# Patient Record
Sex: FEMALE | URBAN - METROPOLITAN AREA
[De-identification: names, ages, dates, MRNs, and addresses within clinical notes are randomized per-mention and may not be internally consistent; named-entity substitution may affect disease eponyms.]

---

## 2021-05-28 ENCOUNTER — OFFICE VISIT (OUTPATIENT)
Dept: URBAN - METROPOLITAN AREA CLINIC 27 | Facility: CLINIC | Age: 66
End: 2021-05-28
Payer: COMMERCIAL

## 2021-05-28 DIAGNOSIS — H53.10 UNSPECIFIED SUBJECTIVE VISUAL DISTURBANCES: Primary | ICD-10-CM

## 2021-05-28 DIAGNOSIS — H04.123 DRY EYE SYNDROME OF BILATERAL LACRIMAL GLANDS: ICD-10-CM

## 2021-05-28 DIAGNOSIS — H33.8 OTHER RETINAL DETACHMENTS: ICD-10-CM

## 2021-05-28 DIAGNOSIS — H43.812 VITREOUS DEGENERATION, LEFT EYE: ICD-10-CM

## 2021-05-28 DIAGNOSIS — H25.11 AGE-RELATED NUCLEAR CATARACT, RIGHT EYE: ICD-10-CM

## 2021-05-28 DIAGNOSIS — H35.81 RETINAL EDEMA: ICD-10-CM

## 2021-05-28 DIAGNOSIS — H33.313 HORSESHOE TEAR OF RETINA WITHOUT DETACHMENT, BILATERAL: ICD-10-CM

## 2021-05-28 DIAGNOSIS — Z96.1 PRESENCE OF INTRAOCULAR LENS: ICD-10-CM

## 2021-05-28 PROCEDURE — 99214 OFFICE O/P EST MOD 30 MIN: CPT | Performed by: OPHTHALMOLOGY

## 2021-05-28 PROCEDURE — 92134 CPTRZ OPH DX IMG PST SGM RTA: CPT | Performed by: OPHTHALMOLOGY

## 2021-05-28 PROCEDURE — 92235 FLUORESCEIN ANGRPH MLTIFRAME: CPT | Performed by: OPHTHALMOLOGY

## 2021-05-28 ASSESSMENT — INTRAOCULAR PRESSURE
OS: 18
OD: 18

## 2021-05-28 NOTE — IMPRESSION/PLAN
Impression: h/o ERM, OS. Status: Symptomatic.
s/p PPV/MP ILM/ICG/PRP/dex 12/11/17 ([address] [place] [address])
s/p STK 08/07/15
s/p PPV / MP / PRP 06/09/14 (K)
s/p IVK x last 11/20/15 Plan: Exam and OCT reveal macular thickening OS (358 microns, from 405 microns). Observe.

## 2021-05-28 NOTE — IMPRESSION/PLAN
Impression: Visual distortion, OS. Status: Symptomatic. Plan: No interval changes. The patient notes a gray patch in her nasal field OS, which she feels is lightening slightly. VA has worsened from 20/30 to 20/400 OS following surgery. Exam and OCT reveal macular thickening OS (344 microns, from 405 microns). An IVFA from 05/28/2021 demonstrated no RVO or leakage. Fundus Photos from 05/28/2021 demonstrated a normal appearing fundus. I see no obvious retinal cause for the decreased VA, the surgical procedure was uneventful. The differential diagnosis includes nghia-operative NAION vs anesthesia block toxicity vs reperfused BRAO. Has seen Dr. Vikram Paris at [facility]. Observe retina. Thanks, Narendra Damon in 12 months for follow up, OCT OU, IVFA OS 1st

## 2021-05-28 NOTE — IMPRESSION/PLAN
Impression: h/o RD, OS. Status: Symptomatic.
s/p PPV/SBP 10/10/16 (Dr. Leger Friend) Plan: Posteriorly placed SB. Retina flat. Observe. RDW.

## 2021-05-28 NOTE — IMPRESSION/PLAN
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 Status: Chronic. Plan: No interval change. Observe.

## 2022-06-01 ENCOUNTER — OFFICE VISIT (OUTPATIENT)
Dept: URBAN - METROPOLITAN AREA CLINIC 27 | Facility: CLINIC | Age: 67
End: 2022-06-01
Payer: MEDICARE

## 2022-06-01 DIAGNOSIS — H33.8 OTHER RETINAL DETACHMENTS: ICD-10-CM

## 2022-06-01 DIAGNOSIS — H35.81 RETINAL EDEMA: ICD-10-CM

## 2022-06-01 DIAGNOSIS — H04.123 DRY EYE SYNDROME OF BILATERAL LACRIMAL GLANDS: ICD-10-CM

## 2022-06-01 DIAGNOSIS — H25.11 AGE-RELATED NUCLEAR CATARACT, RIGHT EYE: ICD-10-CM

## 2022-06-01 DIAGNOSIS — H33.313 HORSESHOE TEAR OF RETINA WITHOUT DETACHMENT, BILATERAL: ICD-10-CM

## 2022-06-01 DIAGNOSIS — Z96.1 PRESENCE OF INTRAOCULAR LENS: ICD-10-CM

## 2022-06-01 DIAGNOSIS — H43.812 VITREOUS DEGENERATION, LEFT EYE: ICD-10-CM

## 2022-06-01 DIAGNOSIS — H53.10 UNSPECIFIED SUBJECTIVE VISUAL DISTURBANCES: Primary | ICD-10-CM

## 2022-06-01 PROCEDURE — 99214 OFFICE O/P EST MOD 30 MIN: CPT | Performed by: OPHTHALMOLOGY

## 2022-06-01 PROCEDURE — 92235 FLUORESCEIN ANGRPH MLTIFRAME: CPT | Performed by: OPHTHALMOLOGY

## 2022-06-01 PROCEDURE — 92134 CPTRZ OPH DX IMG PST SGM RTA: CPT | Performed by: OPHTHALMOLOGY

## 2022-06-01 ASSESSMENT — INTRAOCULAR PRESSURE
OD: 11
OS: 13

## 2022-06-01 NOTE — IMPRESSION/PLAN
Impression: h/o ERM, OS. Status: Symptomatic.
s/p PPV/MP ILM/ICG/PRP/dex 12/11/17 ([address] [place] [address])
s/p STK 08/07/15
s/p PPV / MP / PRP 06/09/14 (K)
s/p IVK x last 11/20/15 Plan: Exam and OCT reveal macular thickening OS (246 microns, from 405 microns). Observe.

## 2022-06-01 NOTE — IMPRESSION/PLAN
Impression: h/o RD, OS. Status: Symptomatic.
s/p PPV/SBP 10/10/16 (Dr. Dewayne Bautista) Plan: Posteriorly placed SB. Retina flat. Observe. RDW.

## 2023-04-05 ENCOUNTER — OFFICE VISIT (OUTPATIENT)
Dept: URBAN - METROPOLITAN AREA CLINIC 27 | Facility: CLINIC | Age: 68
End: 2023-04-05
Payer: MEDICARE

## 2023-04-05 DIAGNOSIS — H04.123 DRY EYE SYNDROME OF BILATERAL LACRIMAL GLANDS: ICD-10-CM

## 2023-04-05 DIAGNOSIS — H53.10 UNSPECIFIED SUBJECTIVE VISUAL DISTURBANCES: Primary | ICD-10-CM

## 2023-04-05 DIAGNOSIS — H35.81 RETINAL EDEMA: ICD-10-CM

## 2023-04-05 DIAGNOSIS — Z96.1 PRESENCE OF INTRAOCULAR LENS: ICD-10-CM

## 2023-04-05 DIAGNOSIS — H43.812 VITREOUS DEGENERATION, LEFT EYE: ICD-10-CM

## 2023-04-05 DIAGNOSIS — H25.11 AGE-RELATED NUCLEAR CATARACT, RIGHT EYE: ICD-10-CM

## 2023-04-05 DIAGNOSIS — H33.313 HORSESHOE TEAR OF RETINA WITHOUT DETACHMENT, BILATERAL: ICD-10-CM

## 2023-04-05 DIAGNOSIS — H33.8 OTHER RETINAL DETACHMENTS: ICD-10-CM

## 2023-04-05 PROCEDURE — 92134 CPTRZ OPH DX IMG PST SGM RTA: CPT | Performed by: OPHTHALMOLOGY

## 2023-04-05 PROCEDURE — 99214 OFFICE O/P EST MOD 30 MIN: CPT | Performed by: OPHTHALMOLOGY

## 2023-04-05 ASSESSMENT — INTRAOCULAR PRESSURE
OD: 16
OS: 17

## 2023-04-05 NOTE — IMPRESSION/PLAN
Impression: h/o RD, OS. Status: Symptomatic.
s/p PPV/SBP 10/10/16 (Dr. Marline Jones) Plan: Posteriorly placed SB. Retina flat. Observe. RDW.

## 2023-04-05 NOTE — IMPRESSION/PLAN
Impression: Visual distortion, OS. Status: Symptomatic. Plan: No interval changes. The patient notes a gray patch in her nasal field OS, which she feels is lightening slightly. VA has worsened from 20/30 to 20/400 OS following surgery. Exam and OCT reveal macular thickening OS (246 microns, from 405 microns). An IVFA from 06/01/2022 demonstrated no RVO or leakage. Fundus Photos from 06/01/2022 demonstrated a normal appearing fundus. I see no obvious retinal cause for the decreased VA, the surgical procedure was uneventful. The differential diagnosis includes nghia-operative NAION vs anesthesia block toxicity vs reperfused BRAO. Has seen Dr. Mariam Alcocer at [facility]. Observe retina. Thanks, Chao Bonilla Return in 18 months for follow up, OCT OU, IVFA OS 1st

## 2023-04-05 NOTE — IMPRESSION/PLAN
Impression: h/o ERM, OS. Status: Symptomatic.
s/p PPV/MP ILM/ICG/PRP/dex 12/11/17 ([address] [place] [address])
s/p STK 08/07/15
s/p PPV / MP / PRP 06/09/14 (K)
s/p IVK x last 11/20/15 Plan: Exam and OCT reveal macular thickening OS (323 microns, from 405 microns). Observe.

## 2023-12-28 ENCOUNTER — OFFICE VISIT (OUTPATIENT)
Dept: URBAN - METROPOLITAN AREA CLINIC 27 | Facility: CLINIC | Age: 68
End: 2023-12-28
Payer: MEDICARE

## 2023-12-28 DIAGNOSIS — H33.8 OTHER RETINAL DETACHMENTS: ICD-10-CM

## 2023-12-28 DIAGNOSIS — H47.092 OTHER DISORDER OF OPTIC NERVE OF LT EYE: ICD-10-CM

## 2023-12-28 DIAGNOSIS — H35.81 RETINAL EDEMA: Primary | ICD-10-CM

## 2023-12-28 DIAGNOSIS — H25.11 AGE-RELATED NUCLEAR CATARACT, RIGHT EYE: ICD-10-CM

## 2023-12-28 DIAGNOSIS — Z96.1 PRESENCE OF INTRAOCULAR LENS: ICD-10-CM

## 2023-12-28 PROCEDURE — 92250 FUNDUS PHOTOGRAPHY W/I&R: CPT | Performed by: OPHTHALMOLOGY

## 2023-12-28 PROCEDURE — 92235 FLUORESCEIN ANGRPH MLTIFRAME: CPT | Performed by: OPHTHALMOLOGY

## 2023-12-28 PROCEDURE — 92014 COMPRE OPH EXAM EST PT 1/>: CPT | Performed by: OPHTHALMOLOGY

## 2023-12-28 PROCEDURE — 92134 CPTRZ OPH DX IMG PST SGM RTA: CPT | Performed by: OPHTHALMOLOGY

## 2023-12-28 ASSESSMENT — INTRAOCULAR PRESSURE
OD: 20
OS: 20

## 2024-07-01 ENCOUNTER — OFFICE VISIT (OUTPATIENT)
Dept: URBAN - METROPOLITAN AREA CLINIC 27 | Facility: CLINIC | Age: 69
End: 2024-07-01
Payer: MEDICARE

## 2024-07-01 DIAGNOSIS — H35.81 RETINAL EDEMA: Primary | ICD-10-CM

## 2024-07-01 DIAGNOSIS — H25.11 AGE-RELATED NUCLEAR CATARACT, RIGHT EYE: ICD-10-CM

## 2024-07-01 DIAGNOSIS — H47.092 OTHER DISORDER OF OPTIC NERVE OF LT EYE: ICD-10-CM

## 2024-07-01 DIAGNOSIS — Z96.1 PRESENCE OF INTRAOCULAR LENS: ICD-10-CM

## 2024-07-01 DIAGNOSIS — H33.8 OTHER RETINAL DETACHMENTS: ICD-10-CM

## 2024-07-01 PROCEDURE — 92134 CPTRZ OPH DX IMG PST SGM RTA: CPT | Performed by: OPHTHALMOLOGY

## 2024-07-01 PROCEDURE — 92014 COMPRE OPH EXAM EST PT 1/>: CPT | Performed by: OPHTHALMOLOGY

## 2024-07-01 PROCEDURE — 92250 FUNDUS PHOTOGRAPHY W/I&R: CPT | Performed by: OPHTHALMOLOGY

## 2024-07-01 ASSESSMENT — INTRAOCULAR PRESSURE
OS: 16
OD: 16

## 2025-06-27 ENCOUNTER — OFFICE VISIT (OUTPATIENT)
Dept: URBAN - METROPOLITAN AREA CLINIC 27 | Facility: CLINIC | Age: 70
End: 2025-06-27
Payer: MEDICARE

## 2025-06-27 DIAGNOSIS — H47.092 OTHER DISORDER OF OPTIC NERVE OF LT EYE: ICD-10-CM

## 2025-06-27 DIAGNOSIS — H33.8 OTHER RETINAL DETACHMENTS: ICD-10-CM

## 2025-06-27 DIAGNOSIS — H04.123 DRY EYE SYNDROME OF BILATERAL LACRIMAL GLANDS: ICD-10-CM

## 2025-06-27 DIAGNOSIS — Z96.1 PRESENCE OF INTRAOCULAR LENS: ICD-10-CM

## 2025-06-27 DIAGNOSIS — H25.11 AGE-RELATED NUCLEAR CATARACT, RIGHT EYE: ICD-10-CM

## 2025-06-27 DIAGNOSIS — H35.81 RETINAL EDEMA: Primary | ICD-10-CM

## 2025-06-27 PROCEDURE — 92134 CPTRZ OPH DX IMG PST SGM RTA: CPT | Performed by: OPHTHALMOLOGY

## 2025-06-27 PROCEDURE — 99214 OFFICE O/P EST MOD 30 MIN: CPT | Performed by: OPHTHALMOLOGY

## 2025-06-27 PROCEDURE — 92235 FLUORESCEIN ANGRPH MLTIFRAME: CPT | Performed by: OPHTHALMOLOGY

## 2025-06-27 ASSESSMENT — INTRAOCULAR PRESSURE
OD: 14
OS: 10